# Patient Record
Sex: MALE
[De-identification: names, ages, dates, MRNs, and addresses within clinical notes are randomized per-mention and may not be internally consistent; named-entity substitution may affect disease eponyms.]

---

## 2017-04-14 ENCOUNTER — CHARTING TRANS (OUTPATIENT)
Dept: OTHER | Age: 22
End: 2017-04-14

## 2017-10-01 ENCOUNTER — HOSPITAL ENCOUNTER (EMERGENCY)
Facility: HOSPITAL | Age: 22
Discharge: HOME | End: 2017-10-01
Attending: EMERGENCY MEDICINE
Payer: COMMERCIAL

## 2017-10-01 VITALS
HEART RATE: 74 BPM | TEMPERATURE: 98 F | RESPIRATION RATE: 16 BRPM | OXYGEN SATURATION: 98 % | WEIGHT: 175 LBS | SYSTOLIC BLOOD PRESSURE: 144 MMHG | DIASTOLIC BLOOD PRESSURE: 83 MMHG

## 2017-10-01 DIAGNOSIS — F32.A DEPRESSION, UNSPECIFIED DEPRESSION TYPE: Primary | ICD-10-CM

## 2017-10-01 DIAGNOSIS — G47.00 INSOMNIA, UNSPECIFIED TYPE: ICD-10-CM

## 2017-10-01 PROCEDURE — 99284 EMERGENCY DEPT VISIT MOD MDM: CPT

## 2017-10-01 RX ORDER — CLONAZEPAM 0.5 MG/1
0.5 TABLET ORAL NIGHTLY PRN
Qty: 10 TABLET | Refills: 0 | Status: SHIPPED | OUTPATIENT
Start: 2017-10-01 | End: 2017-10-04

## 2017-10-02 NOTE — ED NOTES
DC instructions reviewed w/  Patient and his mother. He understands to FU w/ PCP. Resources given to patient by psych liaison patient denies any questions regarding info given. Vitals stable. Prescription provided w/ education.  All questions answered; pt d

## 2017-10-02 NOTE — ED INITIAL ASSESSMENT (HPI)
Pt has been off ADHD meds, per mom having\"mood swings\" recent break up with girlfriend. Denies any suicidial ideations.

## 2017-10-02 NOTE — ED PROVIDER NOTES
Patient Seen in: [facility] AND [facility] Emergency Department    History   Patient presents with:  Eval-P (psychiatric)    Stated Complaint: Psych eval    HPI    36-year-old male presents with mother for psychiatric evaluation.   Patient's girlfriend broke up w 75   Temp 98.4 °F (36.9 °C) (Oral)   Resp 16   Wt 79.4 kg   SpO2 98%         Physical Exam   Constitutional: He is oriented to person, place, and time. He appears well-developed and well-nourished. HENT:   Head: Normocephalic and atraumatic.    Neck: Norm of 10/1/2017  9:46 PM    START taking these medications    ClonazePAM 0.5 MG Oral Tab  Take 1 tablet (0.5 mg total) by mouth nightly as needed (sleep). , Normal, Disp-10 tablet, R-0

## 2017-10-23 ENCOUNTER — APPOINTMENT (OUTPATIENT)
Dept: CT IMAGING | Facility: HOSPITAL | Age: 22
End: 2017-10-23
Attending: EMERGENCY MEDICINE
Payer: COMMERCIAL

## 2017-10-23 ENCOUNTER — HOSPITAL ENCOUNTER (EMERGENCY)
Facility: HOSPITAL | Age: 22
Discharge: HOME | End: 2017-10-23
Attending: EMERGENCY MEDICINE
Payer: COMMERCIAL

## 2017-10-23 ENCOUNTER — HOSPITAL ENCOUNTER (OUTPATIENT)
Age: 22
Discharge: HOME | End: 2017-10-23
Attending: EMERGENCY MEDICINE
Payer: COMMERCIAL

## 2017-10-23 ENCOUNTER — APPOINTMENT (OUTPATIENT)
Dept: ULTRASOUND IMAGING | Facility: HOSPITAL | Age: 22
End: 2017-10-23
Payer: COMMERCIAL

## 2017-10-23 VITALS
TEMPERATURE: 98 F | RESPIRATION RATE: 16 BRPM | SYSTOLIC BLOOD PRESSURE: 127 MMHG | WEIGHT: 187 LBS | HEART RATE: 82 BPM | BODY MASS INDEX: 25 KG/M2 | OXYGEN SATURATION: 99 % | DIASTOLIC BLOOD PRESSURE: 76 MMHG

## 2017-10-23 VITALS
DIASTOLIC BLOOD PRESSURE: 90 MMHG | TEMPERATURE: 99 F | SYSTOLIC BLOOD PRESSURE: 155 MMHG | HEIGHT: 73 IN | RESPIRATION RATE: 18 BRPM | BODY MASS INDEX: 24.78 KG/M2 | HEART RATE: 80 BPM | OXYGEN SATURATION: 97 % | WEIGHT: 187 LBS

## 2017-10-23 DIAGNOSIS — N50.812 TESTICULAR PAIN, LEFT: Primary | ICD-10-CM

## 2017-10-23 DIAGNOSIS — N12 PYELONEPHRITIS: Primary | ICD-10-CM

## 2017-10-23 PROCEDURE — 87086 URINE CULTURE/COLONY COUNT: CPT | Performed by: EMERGENCY MEDICINE

## 2017-10-23 PROCEDURE — 93975 VASCULAR STUDY: CPT | Performed by: EMERGENCY MEDICINE

## 2017-10-23 PROCEDURE — 87077 CULTURE AEROBIC IDENTIFY: CPT | Performed by: EMERGENCY MEDICINE

## 2017-10-23 PROCEDURE — 99212 OFFICE O/P EST SF 10 MIN: CPT

## 2017-10-23 PROCEDURE — 81001 URINALYSIS AUTO W/SCOPE: CPT | Performed by: EMERGENCY MEDICINE

## 2017-10-23 PROCEDURE — 76870 US EXAM SCROTUM: CPT | Performed by: EMERGENCY MEDICINE

## 2017-10-23 PROCEDURE — 99284 EMERGENCY DEPT VISIT MOD MDM: CPT

## 2017-10-23 PROCEDURE — 36415 COLL VENOUS BLD VENIPUNCTURE: CPT

## 2017-10-23 PROCEDURE — 85025 COMPLETE CBC W/AUTO DIFF WBC: CPT | Performed by: EMERGENCY MEDICINE

## 2017-10-23 PROCEDURE — 74176 CT ABD & PELVIS W/O CONTRAST: CPT | Performed by: EMERGENCY MEDICINE

## 2017-10-23 PROCEDURE — 80048 BASIC METABOLIC PNL TOTAL CA: CPT | Performed by: EMERGENCY MEDICINE

## 2017-10-23 RX ORDER — CIPROFLOXACIN 500 MG/1
500 TABLET, FILM COATED ORAL 2 TIMES DAILY
Qty: 14 TABLET | Refills: 0 | Status: SHIPPED | OUTPATIENT
Start: 2017-10-23 | End: 2017-10-30
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
## 2017-10-23 NOTE — ED INITIAL ASSESSMENT (HPI)
Left Testicular pain since this am (0600) took advil and pain improved. Pain worse with with lifting objects.   Temp 99.6 2 days ago r/t cold and cough

## 2017-10-24 NOTE — ED PROVIDER NOTES
Patient Seen in: [facility] Emergency Department    History   Patient presents with:  Testicular Swelling    Stated Complaint: sent from [address] [place] [address], needs US on testicle    HPI    72-year-old male with history of ADHD here with complaints of left testicul Eyes: Negative for pain, discharge and redness. Respiratory: Negative for cough, shortness of breath and wheezing. Cardiovascular: Negative for chest pain. Gastrointestinal: Positive for abdominal pain and nausea.  Negative for diarrhea and vomitin no hernia noted   Musculoskeletal: Normal range of motion. He exhibits no tenderness. No CVA tenderness bilaterally   Neurological: He is alert and oriented to person, place, and time.    5/5 strength in b/l UEs and LEs, normal sensation in all extremitie 1.035   pH Urine 7.0 5.0 - 8.0   Protein Urine Negative Negative mg/dL   Glucose Urine Negative Negative mg/dL   Ketones Urine Negative Negative mg/dL   Bilirubin Urine Negative Negative   Blood Urine Moderate (A) Negative   Nitrite Urine Negative Negative pressure re-checked within 1 week was provided. Medical Record Review: I personally reviewed available prior medical records for any recent pertinent discharge summaries, testing, and procedures, and reviewed those reports. Complicating Factors:  The

## 2018-01-26 ENCOUNTER — OFFICE VISIT (OUTPATIENT)
Dept: FAMILY MEDICINE CLINIC | Facility: CLINIC | Age: 23
End: 2018-01-26

## 2018-01-26 ENCOUNTER — HOSPITAL ENCOUNTER (OUTPATIENT)
Dept: GENERAL RADIOLOGY | Age: 23
Discharge: HOME | End: 2018-01-26
Attending: FAMILY MEDICINE
Payer: COMMERCIAL

## 2018-01-26 VITALS
DIASTOLIC BLOOD PRESSURE: 87 MMHG | HEART RATE: 96 BPM | TEMPERATURE: 98 F | SYSTOLIC BLOOD PRESSURE: 143 MMHG | WEIGHT: 198.69 LBS | BODY MASS INDEX: 26 KG/M2

## 2018-01-26 DIAGNOSIS — M79.671 FOOT PAIN, RIGHT: Primary | ICD-10-CM

## 2018-01-26 DIAGNOSIS — M79.671 FOOT PAIN, RIGHT: ICD-10-CM

## 2018-01-26 PROCEDURE — 73630 X-RAY EXAM OF FOOT: CPT | Performed by: FAMILY MEDICINE

## 2018-01-26 PROCEDURE — 99202 OFFICE O/P NEW SF 15 MIN: CPT | Performed by: FAMILY MEDICINE

## 2018-01-26 PROCEDURE — 99212 OFFICE O/P EST SF 10 MIN: CPT | Performed by: FAMILY MEDICINE

## 2018-01-28 ENCOUNTER — TELEPHONE (OUTPATIENT)
Dept: FAMILY MEDICINE CLINIC | Facility: CLINIC | Age: 23
End: 2018-01-28

## 2018-01-28 DIAGNOSIS — M79.673 PAIN OF FOOT, UNSPECIFIED LATERALITY: Primary | ICD-10-CM

## 2018-01-29 NOTE — TELEPHONE ENCOUNTER
Sergio notified foot xr normal. Sergio is still having pain and was said it was like he didn't even go to the Dr as he wasn't given anything. Advised [facility] had referred him to podiatry, given number to schedule.

## 2018-02-20 ENCOUNTER — HOSPITAL ENCOUNTER (OUTPATIENT)
Age: 23
Discharge: HOME | End: 2018-02-20
Attending: FAMILY MEDICINE
Payer: COMMERCIAL

## 2018-02-20 VITALS
HEIGHT: 73 IN | OXYGEN SATURATION: 100 % | HEART RATE: 91 BPM | BODY MASS INDEX: 26.24 KG/M2 | WEIGHT: 198 LBS | TEMPERATURE: 98 F | SYSTOLIC BLOOD PRESSURE: 128 MMHG | DIASTOLIC BLOOD PRESSURE: 79 MMHG | RESPIRATION RATE: 16 BRPM

## 2018-02-20 DIAGNOSIS — A64 STI (SEXUALLY TRANSMITTED INFECTION): Primary | ICD-10-CM

## 2018-02-20 LAB
URINE BILIRUBIN: NEGATIVE
URINE CLARITY: CLEAR
URINE COLOR: YELLOW
URINE GLUCOSE: NEGATIVE MG/DL
URINE KETONES: NEGATIVE MG/DL
URINE NITRITE: NEGATIVE
URINE PH: 7
URINE PROTEIN: NEGATIVE MG /DL
URINE SPECIFIC GRAVITY: 1.02
URINE UROBILINOGEN: 0.2 MG/DL

## 2018-02-20 PROCEDURE — 96372 THER/PROPH/DIAG INJ SC/IM: CPT

## 2018-02-20 PROCEDURE — 99214 OFFICE O/P EST MOD 30 MIN: CPT

## 2018-02-20 PROCEDURE — 87591 N.GONORRHOEAE DNA AMP PROB: CPT | Performed by: FAMILY MEDICINE

## 2018-02-20 PROCEDURE — 87086 URINE CULTURE/COLONY COUNT: CPT | Performed by: FAMILY MEDICINE

## 2018-02-20 PROCEDURE — 87491 CHLMYD TRACH DNA AMP PROBE: CPT | Performed by: FAMILY MEDICINE

## 2018-02-20 PROCEDURE — 81002 URINALYSIS NONAUTO W/O SCOPE: CPT

## 2018-02-20 RX ORDER — CEFTRIAXONE 500 MG/1
250 INJECTION, POWDER, FOR SOLUTION INTRAMUSCULAR; INTRAVENOUS ONCE
Status: COMPLETED | OUTPATIENT
Start: 2018-02-20 | End: 2018-02-20

## 2018-02-20 RX ORDER — AZITHROMYCIN 250 MG/1
1000 TABLET, FILM COATED ORAL ONCE
Status: COMPLETED | OUTPATIENT
Start: 2018-02-20 | End: 2018-02-20

## 2018-02-20 NOTE — ED PROVIDER NOTES
Patient Seen in: [facility] Immediate [facility] In [address] [place] [address]    History   Patient presents with:  Eval-G (gynecologic)    Stated Complaint: Painful urination/discharge    HPI  Patient here with purulent penile discharge. For the past 2 days.   Per aysha penile discharge noted at the meatus.   Scant        ED Course     Labs Reviewed   [facility] POCT URINALYSIS DIPSTICK MANUAL   CHLAMYDIA/GONOCOCCUS, SERJIO   URINE CULTURE, ROUTINE       ED Course as of Feb 20 1543  --------------------------------------------------

## 2018-02-21 LAB
C TRACH DNA SPEC QL NAA+PROBE: NEGATIVE
N GONORRHOEA DNA SPEC QL NAA+PROBE: POSITIVE

## 2018-02-22 ENCOUNTER — APPOINTMENT (OUTPATIENT)
Dept: LAB | Age: 23
End: 2018-02-22
Attending: INTERNAL MEDICINE
Payer: COMMERCIAL

## 2018-02-22 ENCOUNTER — OFFICE VISIT (OUTPATIENT)
Dept: INTERNAL MEDICINE CLINIC | Facility: CLINIC | Age: 23
End: 2018-02-22

## 2018-02-22 VITALS
WEIGHT: 189 LBS | DIASTOLIC BLOOD PRESSURE: 82 MMHG | SYSTOLIC BLOOD PRESSURE: 143 MMHG | BODY MASS INDEX: 25.05 KG/M2 | HEART RATE: 102 BPM | HEIGHT: 73 IN

## 2018-02-22 DIAGNOSIS — IMO0002 ACUTE UROGENITAL GONORRHEA: Primary | ICD-10-CM

## 2018-02-22 DIAGNOSIS — IMO0002 ACUTE UROGENITAL GONORRHEA: ICD-10-CM

## 2018-02-22 PROCEDURE — 86803 HEPATITIS C AB TEST: CPT

## 2018-02-22 PROCEDURE — 87389 HIV-1 AG W/HIV-1&-2 AB AG IA: CPT

## 2018-02-22 PROCEDURE — 99214 OFFICE O/P EST MOD 30 MIN: CPT | Performed by: INTERNAL MEDICINE

## 2018-02-22 PROCEDURE — 36415 COLL VENOUS BLD VENIPUNCTURE: CPT

## 2018-02-22 PROCEDURE — 86780 TREPONEMA PALLIDUM: CPT

## 2018-02-22 PROCEDURE — 86706 HEP B SURFACE ANTIBODY: CPT

## 2018-02-22 PROCEDURE — 99212 OFFICE O/P EST SF 10 MIN: CPT | Performed by: INTERNAL MEDICINE

## 2018-02-22 NOTE — PROGRESS NOTES
HPI:    Patient ID: Yesi Childs is a 25year old male. HPI    STI  Pt arrives for a follow up after recent visit to urgent care on 02/20/2018. He was diagnosed with \"STI. \" Per HPI from the visit, \"Patient here with purulent penile discharge. 2.00         Types: Cigarettes  Smokeless tobacco: Former User                     Comment: finding it difficult to quit at the moment  Alcohol use:  Yes              Comment: last alcohol use was 9/30/17--1-2 beer               during weeknight, 12-15 beer HIV AG AB COMBO, HEPATITIS B SURFACE ANTIBODY, HCV ANTIBODY, T PALLIDUM SCREENING [facility]      Orders Placed This Encounter      HIV AG AB Combo [E]      Hepatitis B Surface Antibody [E]      HCV Antibody [E]      T Pallidum Screening [place] TREP [E]    M

## 2018-02-23 LAB
HBV SURFACE AB SER-ACNC: 400.36 MIU/ML (ref ?–10)
HBV SURFACE AG SERPL QL IA: REACTIVE
HCV AB SERPL QL IA: NONREACTIVE
HIV1+2 AB SERPL QL IA: NONREACTIVE
T PALLIDUM AB SER QL: NEGATIVE

## 2018-02-28 ENCOUNTER — TELEPHONE (OUTPATIENT)
Dept: OTHER | Age: 23
End: 2018-02-28

## 2018-02-28 DIAGNOSIS — R76.8 HEPATITIS B ANTIBODY POSITIVE: Primary | ICD-10-CM

## 2018-02-28 NOTE — TELEPHONE ENCOUNTER
Advised patient on Dr. Scar March information and recommendation, lab to follow-up hepatitis B. Patient verbalized understanding and will get the lab done. Lab order created per Dr. Ritika Santoro.         Notes Recorded by Ting Alexander on 2/28/2018 at 3:42 PM CST  F

## 2018-04-10 ENCOUNTER — OFFICE VISIT (OUTPATIENT)
Dept: FAMILY MEDICINE CLINIC | Facility: CLINIC | Age: 23
End: 2018-04-10

## 2018-04-10 VITALS
HEART RATE: 72 BPM | OXYGEN SATURATION: 98 % | SYSTOLIC BLOOD PRESSURE: 118 MMHG | DIASTOLIC BLOOD PRESSURE: 70 MMHG | RESPIRATION RATE: 14 BRPM | TEMPERATURE: 98 F

## 2018-04-10 DIAGNOSIS — J02.9 ACUTE PHARYNGITIS, UNSPECIFIED ETIOLOGY: Primary | ICD-10-CM

## 2018-04-10 PROCEDURE — 87081 CULTURE SCREEN ONLY: CPT | Performed by: NURSE PRACTITIONER

## 2018-04-10 PROCEDURE — 99202 OFFICE O/P NEW SF 15 MIN: CPT | Performed by: NURSE PRACTITIONER

## 2018-04-10 PROCEDURE — 87880 STREP A ASSAY W/OPTIC: CPT | Performed by: NURSE PRACTITIONER

## 2018-04-10 RX ORDER — DEXTROAMPHETAMINE SACCHARATE, AMPHETAMINE ASPARTATE MONOHYDRATE, DEXTROAMPHETAMINE SULFATE AND AMPHETAMINE SULFATE 6.25; 6.25; 6.25; 6.25 MG/1; MG/1; MG/1; MG/1
1 CAPSULE, EXTENDED RELEASE ORAL
COMMUNITY
Start: 2017-01-30 | End: 2021-05-17

## 2018-04-10 NOTE — PATIENT INSTRUCTIONS
Pharyngitis (Sore Throat), Report Pending    Pharyngitis (sore throat) is often due to a virus. It can also be caused by the streptococcus, or strep, bacterium, often called strep throat.  Both viral and strep infections can cause throat pain that is wors · For children: Use acetaminophen for fever, fussiness, or discomfort.  In infants older than 10months of age, you may use ibuprofen instead of acetaminophen. Talk with your child's healthcare provider before giving these medicines if your child has chronic · Signs of dehydration (very dark urine or no urine, sunken eyes, dizziness)  · Trouble breathing or noisy breathing  · Muffled voice  · New rash  · Child appears to be getting sicker  Date Last Reviewed: 4/13/2015  © 2692-4126 [employer].  8

## 2018-04-10 NOTE — PROGRESS NOTES
CHIEF COMPLAINT:   Patient presents with:  Sore Throat        HPI:   Monae Turner is a 25year old male presents to clinic with complaint of sore throat starting this morning. Other household members sick.  Patient reports due to sore throat he mi LUNGS: clear to auscultation bilaterally, no wheezes or rhonchi. Breathing is non labored. CARDIO: RRR without murmur  EXTREMITIES: no cyanosis, clubbing or edema  LYMPH: no anterior cervical. no submandibular lymphadenopathy.   No posterior cervical or oc A test has been done to find out whether you (or your child, if your child is the patient) have strep throat. Call this facility or your healthcare provider if you were not given your test results.  If the test is positive for strep infection, you will need · Use throat lozenges or numbing throat sprays to help reduce pain. Gargling with warm salt water will also help reduce throat pain. For this, dissolve 1/2 teaspoon of salt in 1 glass of warm water.  To help soothe a sore throat, children can sip on juice o

## 2018-06-05 ENCOUNTER — APPOINTMENT (OUTPATIENT)
Dept: OCCUPATIONAL MEDICINE | Age: 23
End: 2018-06-05
Attending: EMERGENCY MEDICINE

## 2019-04-24 ENCOUNTER — TELEPHONE (OUTPATIENT)
Dept: SCHEDULING | Age: 24
End: 2019-04-24

## 2019-08-16 ENCOUNTER — HOSPITAL ENCOUNTER (OUTPATIENT)
Age: 24
Discharge: HOME | End: 2019-08-16
Attending: EMERGENCY MEDICINE
Payer: COMMERCIAL

## 2019-08-16 VITALS
DIASTOLIC BLOOD PRESSURE: 80 MMHG | OXYGEN SATURATION: 97 % | HEIGHT: 73 IN | HEART RATE: 91 BPM | SYSTOLIC BLOOD PRESSURE: 124 MMHG | RESPIRATION RATE: 18 BRPM | WEIGHT: 215 LBS | BODY MASS INDEX: 28.49 KG/M2 | TEMPERATURE: 98 F

## 2019-08-16 DIAGNOSIS — R30.0 DYSURIA: Primary | ICD-10-CM

## 2019-08-16 LAB
BILIRUB UR QL STRIP: NEGATIVE
CLARITY UR: CLEAR
COLOR UR: YELLOW
GLUCOSE UR STRIP-MCNC: NEGATIVE MG/DL
HGB UR QL STRIP: NEGATIVE
KETONES UR STRIP-MCNC: NEGATIVE MG/DL
NITRITE UR QL STRIP: NEGATIVE
PH UR STRIP: 7 [PH]
PROT UR STRIP-MCNC: NEGATIVE MG/DL
SP GR UR STRIP: 1.02
UROBILINOGEN UR STRIP-ACNC: <2 MG/DL

## 2019-08-16 PROCEDURE — 99214 OFFICE O/P EST MOD 30 MIN: CPT

## 2019-08-16 PROCEDURE — 81002 URINALYSIS NONAUTO W/O SCOPE: CPT

## 2019-08-16 PROCEDURE — 87086 URINE CULTURE/COLONY COUNT: CPT | Performed by: EMERGENCY MEDICINE

## 2019-08-16 PROCEDURE — 87591 N.GONORRHOEAE DNA AMP PROB: CPT | Performed by: EMERGENCY MEDICINE

## 2019-08-16 PROCEDURE — 87491 CHLMYD TRACH DNA AMP PROBE: CPT | Performed by: EMERGENCY MEDICINE

## 2019-08-16 RX ORDER — CEPHALEXIN 500 MG/1
500 CAPSULE ORAL 2 TIMES DAILY
Qty: 14 CAPSULE | Refills: 0 | Status: SHIPPED | OUTPATIENT
Start: 2019-08-16 | End: 2019-08-23

## 2019-08-16 NOTE — ED NOTES
Advised to pract safe sex. Increase po fluids wait for cultures to come back, iif + for uti follow upwith pcp or urology.

## 2019-08-16 NOTE — ED PROVIDER NOTES
Patient Seen in: [facility] In [place]    History     Stated Complaint: urinary symptoms    HPI    This patient complains of burning with urination which he has had for the past week.   Patient denies fever chills abdominal pain or (6' 1\")   Wt 97.5 kg   SpO2 97%   BMI 28.37 kg/m²         Physical Exam    Patient is awake and alert  Eyes pupils are equal reactive  ENT there are moist mucous membranes  Back no CVA area tenderness  Lungs are clear to auscultation  Cardiac there are no

## 2019-08-18 LAB
C TRACH DNA SPEC QL NAA+PROBE: NEGATIVE
N GONORRHOEA DNA SPEC QL NAA+PROBE: NEGATIVE

## 2020-04-24 ENCOUNTER — APPOINTMENT (OUTPATIENT)
Dept: OTHER | Facility: HOSPITAL | Age: 25
End: 2020-04-24
Attending: EMERGENCY MEDICINE

## 2020-04-24 ENCOUNTER — NURSE ONLY (OUTPATIENT)
Dept: INTERNAL MEDICINE CLINIC | Facility: HOSPITAL | Age: 25
End: 2020-04-24
Attending: EMERGENCY MEDICINE

## 2020-04-24 DIAGNOSIS — Z00.00 WELLNESS EXAMINATION: Primary | ICD-10-CM

## 2020-04-24 PROCEDURE — 86787 VARICELLA-ZOSTER ANTIBODY: CPT

## 2020-04-24 PROCEDURE — 86480 TB TEST CELL IMMUN MEASURE: CPT

## 2020-05-27 ENCOUNTER — APPOINTMENT (OUTPATIENT)
Dept: GENERAL RADIOLOGY | Facility: HOSPITAL | Age: 25
End: 2020-05-27
Attending: PHYSICIAN ASSISTANT
Payer: COMMERCIAL

## 2020-05-27 ENCOUNTER — HOSPITAL ENCOUNTER (EMERGENCY)
Facility: HOSPITAL | Age: 25
Discharge: HOME | End: 2020-05-27
Attending: PHYSICIAN ASSISTANT
Payer: COMMERCIAL

## 2020-05-27 VITALS
BODY MASS INDEX: 28.49 KG/M2 | OXYGEN SATURATION: 98 % | SYSTOLIC BLOOD PRESSURE: 128 MMHG | RESPIRATION RATE: 18 BRPM | TEMPERATURE: 98 F | DIASTOLIC BLOOD PRESSURE: 75 MMHG | WEIGHT: 215 LBS | HEART RATE: 98 BPM | HEIGHT: 73 IN

## 2020-05-27 DIAGNOSIS — S50.02XA CONTUSION OF LEFT ELBOW, INITIAL ENCOUNTER: ICD-10-CM

## 2020-05-27 DIAGNOSIS — S93.602A FOOT SPRAIN, LEFT, INITIAL ENCOUNTER: ICD-10-CM

## 2020-05-27 DIAGNOSIS — T07.XXXA ABRASION, MULTIPLE SITES: ICD-10-CM
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
## 2020-05-27 DIAGNOSIS — S93.402A SPRAIN OF LEFT ANKLE, UNSPECIFIED LIGAMENT, INITIAL ENCOUNTER: Primary | ICD-10-CM

## 2020-05-27 DIAGNOSIS — S40.012A CONTUSION OF LEFT SHOULDER, INITIAL ENCOUNTER: ICD-10-CM

## 2020-05-27 PROCEDURE — 73630 X-RAY EXAM OF FOOT: CPT | Performed by: PHYSICIAN ASSISTANT

## 2020-05-27 PROCEDURE — 99284 EMERGENCY DEPT VISIT MOD MDM: CPT

## 2020-05-27 PROCEDURE — 73610 X-RAY EXAM OF ANKLE: CPT | Performed by: PHYSICIAN ASSISTANT

## 2020-05-27 RX ORDER — CEPHALEXIN 500 MG/1
500 CAPSULE ORAL 3 TIMES DAILY
Qty: 21 CAPSULE | Refills: 0 | Status: SHIPPED | OUTPATIENT
Start: 2020-05-27 | End: 2020-06-03

## 2020-05-27 RX ORDER — IBUPROFEN 600 MG/1
TABLET ORAL
Qty: 20 TABLET | Refills: 0 | Status: SHIPPED | OUTPATIENT
Start: 2020-05-27
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
## 2020-05-27 NOTE — ED NOTES
Pt able to dress independently and ambulates steadily with crutches. Discharge instructions reviewed and pt verbalized understanding.
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
## 2020-08-15 ENCOUNTER — HOSPITAL ENCOUNTER (EMERGENCY)
Facility: HOSPITAL | Age: 25
Discharge: HOME | End: 2020-08-15
Attending: PHYSICIAN ASSISTANT
Payer: OTHER MISCELLANEOUS

## 2020-08-15 VITALS
TEMPERATURE: 98 F | WEIGHT: 220 LBS | RESPIRATION RATE: 18 BRPM | DIASTOLIC BLOOD PRESSURE: 95 MMHG | OXYGEN SATURATION: 97 % | HEART RATE: 98 BPM | SYSTOLIC BLOOD PRESSURE: 160 MMHG | BODY MASS INDEX: 29 KG/M2

## 2020-08-15 DIAGNOSIS — S09.90XA CLOSED HEAD INJURY WITHOUT LOSS OF CONSCIOUSNESS, INITIAL ENCOUNTER: ICD-10-CM

## 2020-08-15 DIAGNOSIS — S00.432A CONTUSION OF AURICLE OF LEFT EAR, INITIAL ENCOUNTER: Primary | ICD-10-CM

## 2020-08-15 DIAGNOSIS — R03.0 ELEVATED BLOOD PRESSURE READING: ICD-10-CM

## 2020-08-15 PROCEDURE — 99283 EMERGENCY DEPT VISIT LOW MDM: CPT
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
## 2020-08-15 NOTE — ED NOTES
Spoke with Bernard Castillo, who states there's no protocol that needs to be followed for [employer] employee. Advised pt to complete RL6.

## 2020-08-24 ENCOUNTER — TELEPHONE (OUTPATIENT)
Dept: INTERNAL MEDICINE CLINIC | Facility: HOSPITAL | Age: 25
End: 2020-08-24

## 2020-08-24 DIAGNOSIS — Z20.822 EXPOSURE TO COVID-19 VIRUS: Primary | ICD-10-CM

## 2020-08-27 ENCOUNTER — APPOINTMENT (OUTPATIENT)
Dept: LAB | Age: 25
End: 2020-08-27
Attending: PREVENTIVE MEDICINE
Payer: COMMERCIAL

## 2020-08-27 ENCOUNTER — TELEPHONE (OUTPATIENT)
Dept: INTERNAL MEDICINE CLINIC | Facility: HOSPITAL | Age: 25
End: 2020-08-27

## 2020-08-27 DIAGNOSIS — Z20.822 EXPOSURE TO COVID-19 VIRUS: ICD-10-CM

## 2020-08-27 DIAGNOSIS — Z20.822 EXPOSURE TO COVID-19 VIRUS: Primary | ICD-10-CM

## 2020-08-27 LAB — SARS-COV-2 RNA RESP QL NAA+PROBE: NOT DETECTED

## 2020-09-04 DIAGNOSIS — Z20.822 CLOSE EXPOSURE TO COVID-19 VIRUS: Primary | ICD-10-CM

## 2021-03-19 ENCOUNTER — LABORATORY ENCOUNTER (OUTPATIENT)
Dept: LAB | Facility: REFERENCE LAB | Age: 26
End: 2021-03-19
Attending: FAMILY MEDICINE
Payer: COMMERCIAL

## 2021-03-19 ENCOUNTER — EKG ENCOUNTER (OUTPATIENT)
Dept: LAB | Age: 26
End: 2021-03-19
Attending: FAMILY MEDICINE
Payer: COMMERCIAL

## 2021-03-19 DIAGNOSIS — R94.31 ABNORMAL EKG: Primary | ICD-10-CM

## 2021-03-19 PROCEDURE — 80053 COMPREHEN METABOLIC PANEL: CPT | Performed by: FAMILY MEDICINE

## 2021-03-19 PROCEDURE — 93005 ELECTROCARDIOGRAM TRACING: CPT

## 2021-03-19 PROCEDURE — 36415 COLL VENOUS BLD VENIPUNCTURE: CPT | Performed by: FAMILY MEDICINE

## 2021-03-19 PROCEDURE — 83036 HEMOGLOBIN GLYCOSYLATED A1C: CPT | Performed by: FAMILY MEDICINE

## 2021-03-19 PROCEDURE — 80061 LIPID PANEL: CPT | Performed by: FAMILY MEDICINE

## 2021-03-19 PROCEDURE — 93010 ELECTROCARDIOGRAM REPORT: CPT | Performed by: FAMILY MEDICINE

## 2021-03-19 NOTE — PROGRESS NOTES
HPI:    Patient ID: Gisela Howell is a 26year old male who presents as new patient for adhd, reflux, and smoking cessation. HPI   GERD:  Really bad for past year. Takes TUMS like candy. They help.    Triggers: pastas, etoh, craft beer, whiske level: Not on file    Occupational History      Not on file    Tobacco Use      Smoking status: Current Every Day Smoker        Packs/day: 1.00        Years: 3.00        Pack years: 3        Types: Cigarettes      Smokeless tobacco: Former User      Tobacc Psychiatric/Behavioral: Positive for behavioral problems and decreased concentration. All other systems reviewed and are negative.            /80   Pulse 80   Ht 6' 1\" (1.854 m)   Wt 214 lb (97.1 kg)   SpO2 98%   BMI 28.23 kg/m²     PHYSICAL EXAM No rash. Neurological:      General: No focal deficit present. Mental Status: He is alert. Psychiatric:         Mood and Affect: Mood normal.         Behavior: Behavior normal.         Thought Content:  Thought content normal.         Judgment: [place] his tobacco use risks and options using the USPSTF's Five A's approach:    Ask: Ngozi Leroy is using tobacco products. Assess: We asked about/assessed behavioral health risk and factors affecting choice of behavior change goals/methods.   Specifically I asked

## 2021-03-21 PROBLEM — R94.31 ABNORMAL EKG: Status: ACTIVE | Noted: 2021-03-21

## 2021-03-21 PROBLEM — K21.9 GASTROESOPHAGEAL REFLUX DISEASE: Status: ACTIVE | Noted: 2021-03-21

## 2021-03-21 PROBLEM — F17.210 CIGARETTE SMOKER: Status: ACTIVE | Noted: 2021-03-21

## 2021-04-01 DIAGNOSIS — Z23 NEED FOR VACCINATION: ICD-10-CM

## 2021-05-17 PROBLEM — I45.10 RIGHT BUNDLE BRANCH BLOCK: Status: ACTIVE | Noted: 2021-05-17

## 2021-05-17 NOTE — PROGRESS NOTES
HPI:    Patient ID: Angelina Fernandez is a 26year old male who presents for ADHD and smoking cessation f/u. HPI   ADHD:  Feels better on medication. More focused at work, but feels he needs something stronger.     Tobacco use:   Still smoking edema.   Skin:     General: Skin is warm and dry. Capillary Refill: Capillary refill takes less than 2 seconds. Neurological:      General: No focal deficit present. Mental Status: He is alert.    Psychiatric:         Mood and Affect: Mood alessandra

## 2021-08-28 ENCOUNTER — HOSPITAL ENCOUNTER (OUTPATIENT)
Age: 26
Discharge: HOME | End: 2021-08-28
Payer: COMMERCIAL

## 2021-08-28 VITALS
HEART RATE: 98 BPM | RESPIRATION RATE: 16 BRPM | SYSTOLIC BLOOD PRESSURE: 159 MMHG | DIASTOLIC BLOOD PRESSURE: 66 MMHG | TEMPERATURE: 98 F | OXYGEN SATURATION: 99 %

## 2021-08-28 DIAGNOSIS — B34.9 VIRAL ILLNESS: ICD-10-CM

## 2021-08-28 DIAGNOSIS — Z20.822 ENCOUNTER FOR LABORATORY TESTING FOR COVID-19 VIRUS: Primary | ICD-10-CM

## 2021-08-28 LAB — SARS-COV-2 RNA RESP QL NAA+PROBE: NOT DETECTED

## 2021-08-28 PROCEDURE — U0002 COVID-19 LAB TEST NON-CDC: HCPCS | Performed by: NURSE PRACTITIONER

## 2021-08-28 PROCEDURE — 99213 OFFICE O/P EST LOW 20 MIN: CPT | Performed by: NURSE PRACTITIONER

## 2021-08-28 NOTE — ED INITIAL ASSESSMENT (HPI)
Sinus pressure, congestion, runny nose x 2 weeks. Started with fever and body aches two days go. 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 Not vaccinated.

## 2021-08-29 NOTE — ED PROVIDER NOTES
Patient Seen in: [facility] [place]      History   Patient presents with:   Body ache and/or chills    Stated Complaint: SINUS INFECTION FEVER BODY ACHES    HPI/Subjective:   HPI    18yo male otherise healthy arrive sto the ic with co facial pressure, Temp 97.8 °F (36.6 °C)   Resp 16   SpO2 99%         Physical Exam  Constitutional:       Appearance: He is well-developed. HENT:      Head: Normocephalic and atraumatic. Nose: Congestion and rhinorrhea present.       Mouth/Throat:      Pharynx: Oroph instructed. The patient verbalized understanding of the discharge instructions and plan, also including, if needed, prescription drug management and or OTC drug management.      I spent a total of 17 minutes during chart review, obtaining history, performin

## 2021-08-30 ENCOUNTER — HOSPITAL ENCOUNTER (OUTPATIENT)
Age: 26
Discharge: HOME | End: 2021-08-30
Payer: COMMERCIAL

## 2021-08-30 VITALS
HEART RATE: 95 BPM | RESPIRATION RATE: 16 BRPM | WEIGHT: 215 LBS | DIASTOLIC BLOOD PRESSURE: 75 MMHG | HEIGHT: 73 IN | OXYGEN SATURATION: 97 % | TEMPERATURE: 98 F | SYSTOLIC BLOOD PRESSURE: 132 MMHG | BODY MASS INDEX: 28.49 KG/M2

## 2021-08-30 DIAGNOSIS — R09.81 NASAL CONGESTION: ICD-10-CM

## 2021-08-30 DIAGNOSIS — Z20.822 ENCOUNTER FOR LABORATORY TESTING FOR COVID-19 VIRUS: Primary | ICD-10-CM

## 2021-08-30 PROCEDURE — 99212 OFFICE O/P EST SF 10 MIN: CPT | Performed by: NURSE PRACTITIONER

## 2021-08-31 ENCOUNTER — TELEPHONE (OUTPATIENT)
Dept: INTERNAL MEDICINE CLINIC | Facility: HOSPITAL | Age: 26
End: 2021-08-31

## 2021-08-31 NOTE — TELEPHONE ENCOUNTER
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 9/3/2021    Did you have close contact with someone on your unit while not wearing a mask? (e.g., during meal breaks):  Yes [x]   No []    If yes, who:  Entire department  Do you share a workspace?  Yes [x]   No []       If yes, COVID infection in past 3 months: Remove from work. Order Alinity testing at                                       least 5 days from exposure.                                        If negative may return to work after 7 days from exposure date (on day 8 a

## 2021-09-01 LAB — SARS-COV-2 RNA RESP QL NAA+PROBE: NOT DETECTED

## 2021-09-03 NOTE — ED PROVIDER NOTES
Patient Seen in: [facility] [place]      History   Patient presents with:  Covid-19 Test    Stated Complaint: chills/fatigue/sorethroat/mother+    HPI/Subjective:   HPI    Nasal congestion and covid exposure    Objective:   Past Medical History:   Lenin Witt Appearance: He is well-developed. HENT:      Head: Normocephalic and atraumatic. Nose: Congestion and rhinorrhea present. Eyes:      Extraocular Movements: Extraocular movements intact. Pupils: Pupils are equal, round, and reactive to light. not including time spent performing procedures. MDM   Findings consistent with viral illness less lsikely sepsis, pnx, meningitis, advised pt to quarantine.                              Disposition and Plan     Clinical Impression:  Encounter for l

## 2021-09-07 ENCOUNTER — HOSPITAL ENCOUNTER (EMERGENCY)
Facility: HOSPITAL | Age: 26
Discharge: HOME | End: 2021-09-07
Payer: COMMERCIAL

## 2021-09-07 VITALS
TEMPERATURE: 98 F | HEART RATE: 85 BPM | OXYGEN SATURATION: 100 % | RESPIRATION RATE: 18 BRPM | DIASTOLIC BLOOD PRESSURE: 78 MMHG | SYSTOLIC BLOOD PRESSURE: 128 MMHG

## 2021-09-07 DIAGNOSIS — J02.0 STREP PHARYNGITIS: Primary | ICD-10-CM

## 2021-09-07 LAB — S PYO AG THROAT QL: POSITIVE

## 2021-09-07 PROCEDURE — 87880 STREP A ASSAY W/OPTIC: CPT

## 2021-09-07 PROCEDURE — 99283 EMERGENCY DEPT VISIT LOW MDM: CPT

## 2021-09-07 RX ORDER — AMOXICILLIN 500 MG/1
500 TABLET, FILM COATED ORAL 2 TIMES DAILY
Qty: 20 TABLET | Refills: 0 | Status: SHIPPED | OUTPATIENT
Start: 2021-09-07 | End: 2021-09-17

## 2021-09-07 NOTE — ED INITIAL ASSESSMENT (HPI)
Patient aox3 ambulatory to ed via private vehicle patient co of sore throat x yesterday night +swollen tonsils noted.

## 2021-09-14 NOTE — ED PROVIDER NOTES
HPI    23year oldmale who presents with sore throat x 1 days. No fever  No cough  No shortness of breath  The patient does not have any sick contacts with similar. Pain is worse with swallowing. Pt is tolerating liquids.  Denies: neck stiffness, trau (36.9 °C)   Temp src Oral   SpO2 100 %   O2 Device None (Room air)       Current:/78   Pulse 85   Temp 98.4 °F (36.9 °C) (Oral)   Resp 18   SpO2 100%     GENERAL: well developed, well nourished, well hydrated, no distress  HEAD: normocephalic, atraum

## 2021-10-21 ENCOUNTER — HOSPITAL ENCOUNTER (EMERGENCY)
Facility: HOSPITAL | Age: 26
Discharge: HOME | End: 2021-10-21
Attending: EMERGENCY MEDICINE
Payer: COMMERCIAL

## 2021-10-21 VITALS
RESPIRATION RATE: 18 BRPM | BODY MASS INDEX: 28 KG/M2 | OXYGEN SATURATION: 100 % | TEMPERATURE: 98 F | HEART RATE: 86 BPM | SYSTOLIC BLOOD PRESSURE: 143 MMHG | DIASTOLIC BLOOD PRESSURE: 92 MMHG | WEIGHT: 215 LBS

## 2021-10-21 DIAGNOSIS — G43.109 OCULAR MIGRAINE: Primary | ICD-10-CM

## 2021-10-21 PROCEDURE — 99284 EMERGENCY DEPT VISIT MOD MDM: CPT

## 2021-10-21 PROCEDURE — 96374 THER/PROPH/DIAG INJ IV PUSH: CPT

## 2021-10-21 PROCEDURE — 96361 HYDRATE IV INFUSION ADD-ON: CPT

## 2021-10-21 PROCEDURE — 96375 TX/PRO/DX INJ NEW DRUG ADDON: CPT

## 2021-10-21 RX ORDER — METOCLOPRAMIDE HYDROCHLORIDE 5 MG/ML
10 INJECTION INTRAMUSCULAR; INTRAVENOUS ONCE
Status: COMPLETED | OUTPATIENT
Start: 2021-10-21 | End: 2021-10-21

## 2021-10-21 RX ORDER — BUTALBITAL, ACETAMINOPHEN AND CAFFEINE 50; 325; 40 MG/1; MG/1; MG/1
1 TABLET ORAL EVERY 6 HOURS PRN
Qty: 10 TABLET | Refills: 0 | Status: SHIPPED | OUTPATIENT
Start: 2021-10-21

## 2021-10-21 RX ORDER — DIPHENHYDRAMINE HYDROCHLORIDE 50 MG/ML
25 INJECTION INTRAMUSCULAR; INTRAVENOUS ONCE
Status: COMPLETED | OUTPATIENT
Start: 2021-10-21 | End: 2021-10-21

## 2021-10-21 RX ORDER — DEXAMETHASONE SODIUM PHOSPHATE 4 MG/ML
4 VIAL (ML) INJECTION ONCE
Status: COMPLETED | OUTPATIENT
Start: 2021-10-21 | End: 2021-10-21

## 2021-10-21 RX ORDER — KETOROLAC TROMETHAMINE 15 MG/ML
15 INJECTION, SOLUTION INTRAMUSCULAR; INTRAVENOUS ONCE
Status: COMPLETED | OUTPATIENT
Start: 2021-10-21 | End: 2021-10-21

## 2021-10-21 NOTE — ED INITIAL ASSESSMENT (HPI)
Patient complains of headache for two days, states his R eye was blurry yesterday, not blurry at this time

## 2021-10-22 NOTE — ED PROVIDER NOTES
Patient Seen in: [facility] AND [facility] Emergency Department      History   Patient presents with:  Headache    Stated Complaint: migraine    Subjective:   HPI    32-year-old male healthy here for evaluation of recurrent hemoptysis starting yesterday improved atraumatic. Eyes: Conjunctivae are normal. Pupils are equal, round, and reactive to light. Fundus difficult to visualize bilaterally but grossly appears normal.  Extraocular muscle movements are intact  Neck: Normal range of motion. Neck supple.   No men

## 2022-10-05 ENCOUNTER — HOSPITAL ENCOUNTER (OUTPATIENT)
Age: 27
Discharge: HOME | End: 2022-10-05
Payer: COMMERCIAL

## 2022-10-05 ENCOUNTER — APPOINTMENT (OUTPATIENT)
Dept: GENERAL RADIOLOGY | Age: 27
End: 2022-10-05
Attending: NURSE PRACTITIONER
Payer: COMMERCIAL

## 2022-10-05 VITALS
RESPIRATION RATE: 16 BRPM | HEIGHT: 73 IN | SYSTOLIC BLOOD PRESSURE: 124 MMHG | OXYGEN SATURATION: 97 % | WEIGHT: 250 LBS | TEMPERATURE: 97 F | HEART RATE: 107 BPM | DIASTOLIC BLOOD PRESSURE: 80 MMHG | BODY MASS INDEX: 33.13 KG/M2

## 2022-10-05 DIAGNOSIS — Z71.1 CONCERN ABOUT STD IN MALE WITHOUT DIAGNOSIS: ICD-10-CM

## 2022-10-05 DIAGNOSIS — R30.0 DYSURIA: Primary | ICD-10-CM

## 2022-10-05 DIAGNOSIS — S99.921A INJURY OF RIGHT FOOT, INITIAL ENCOUNTER: ICD-10-CM

## 2022-10-05 DIAGNOSIS — M79.671 RIGHT FOOT PAIN: ICD-10-CM

## 2022-10-05 LAB
BILIRUB UR QL STRIP: NEGATIVE
CLARITY UR: CLEAR
COLOR UR: YELLOW
GLUCOSE UR STRIP-MCNC: NEGATIVE MG/DL
HGB UR QL STRIP: NEGATIVE
KETONES UR STRIP-MCNC: NEGATIVE MG/DL
NITRITE UR QL STRIP: NEGATIVE
PH UR STRIP: 6 [PH]
PROT UR STRIP-MCNC: NEGATIVE MG/DL
SP GR UR STRIP: 1.02
UROBILINOGEN UR STRIP-ACNC: <2 MG/DL

## 2022-10-05 PROCEDURE — 99214 OFFICE O/P EST MOD 30 MIN: CPT | Performed by: NURSE PRACTITIONER

## 2022-10-05 PROCEDURE — 73630 X-RAY EXAM OF FOOT: CPT | Performed by: NURSE PRACTITIONER

## 2022-10-05 PROCEDURE — 81002 URINALYSIS NONAUTO W/O SCOPE: CPT | Performed by: NURSE PRACTITIONER

## 2022-10-05 PROCEDURE — 96372 THER/PROPH/DIAG INJ SC/IM: CPT | Performed by: NURSE PRACTITIONER

## 2022-10-05 RX ORDER — AZITHROMYCIN 250 MG/1
1000 TABLET, FILM COATED ORAL ONCE
Status: COMPLETED | OUTPATIENT
Start: 2022-10-05 | End: 2022-10-05
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
## 2022-10-06 LAB
C TRACH DNA SPEC QL NAA+PROBE: POSITIVE
N GONORRHOEA DNA SPEC QL NAA+PROBE: NEGATIVE

## 2022-10-11 ENCOUNTER — TELEPHONE (OUTPATIENT)
Dept: FAMILY MEDICINE CLINIC | Facility: CLINIC | Age: 27
End: 2022-10-11

## 2022-10-11 DIAGNOSIS — M79.671 RIGHT FOOT PAIN: Primary | ICD-10-CM

## 2022-10-11 NOTE — TELEPHONE ENCOUNTER
Spoke to patient, verified Name and . Referral information provided. Patient verbalized understanding and had no further questions at this time.

## 2022-10-11 NOTE — TELEPHONE ENCOUNTER
States went to [facility] 10/5/22 and xray was done with recommendation to see ortho . Patient has HMO and will need a referral from PCP. Still with right foot pain and wanted to get a referral to see ortho ASAP. Offered office appointment for tomorrow 10/12/22 but declines, requetsing to speak with Dr Eric Rivas number verified.            Future Appointments   Date Time Provider Smiley Lynch   10/26/2022 11:30 AM Priya Jacobson,  [facility] 10 FP [facility] 10 OP

## 2022-10-26 ENCOUNTER — OFFICE VISIT (OUTPATIENT)
Dept: FAMILY MEDICINE CLINIC | Facility: CLINIC | Age: 27
End: 2022-10-26
Payer: COMMERCIAL

## 2022-10-26 VITALS
OXYGEN SATURATION: 96 % | SYSTOLIC BLOOD PRESSURE: 122 MMHG | BODY MASS INDEX: 28.76 KG/M2 | WEIGHT: 217 LBS | HEIGHT: 73 IN | DIASTOLIC BLOOD PRESSURE: 78 MMHG | HEART RATE: 94 BPM

## 2022-10-26 DIAGNOSIS — Z23 NEED FOR VACCINATION: ICD-10-CM

## 2022-10-26 DIAGNOSIS — M79.671 RIGHT FOOT PAIN: ICD-10-CM

## 2022-10-26 DIAGNOSIS — Z00.00 PHYSICAL EXAM, ANNUAL: Primary | ICD-10-CM

## 2022-10-26 DIAGNOSIS — F90.2 ATTENTION DEFICIT HYPERACTIVITY DISORDER (ADHD), COMBINED TYPE: ICD-10-CM

## 2022-10-26 PROCEDURE — 3008F BODY MASS INDEX DOCD: CPT | Performed by: FAMILY MEDICINE

## 2022-10-26 PROCEDURE — 99395 PREV VISIT EST AGE 18-39: CPT | Performed by: FAMILY MEDICINE

## 2022-10-26 PROCEDURE — 90715 TDAP VACCINE 7 YRS/> IM: CPT | Performed by: FAMILY MEDICINE

## 2022-10-26 PROCEDURE — 90471 IMMUNIZATION ADMIN: CPT | Performed by: FAMILY MEDICINE

## 2022-10-26 PROCEDURE — 3074F SYST BP LT 130 MM HG: CPT | Performed by: FAMILY MEDICINE

## 2022-10-26 PROCEDURE — 90472 IMMUNIZATION ADMIN EACH ADD: CPT | Performed by: FAMILY MEDICINE

## 2022-10-26 PROCEDURE — 3078F DIAST BP <80 MM HG: CPT | Performed by: FAMILY MEDICINE

## 2022-10-26 PROCEDURE — 99214 OFFICE O/P EST MOD 30 MIN: CPT | Performed by: FAMILY MEDICINE

## 2022-10-26 RX ORDER — DEXTROAMPHETAMINE SACCHARATE, AMPHETAMINE ASPARTATE MONOHYDRATE, DEXTROAMPHETAMINE SULFATE AND AMPHETAMINE SULFATE 3.75; 3.75; 3.75; 3.75 MG/1; MG/1; MG/1; MG/1
15 CAPSULE, EXTENDED RELEASE ORAL EVERY MORNING
Qty: 30 CAPSULE | Refills: 0 | Status: SHIPPED | OUTPATIENT
Start: 2022-10-26

## 2025-01-24 NOTE — PROGRESS NOTES
HPI:    Patient ID: Deedee Garduno is a 25year old male. Patient with the pain dorsum right foot for more then a month. Denies any direct injury. He does walk a lot at his work.    Pain is now there all the time, worse with prolonged walking
no BM doc'd; not on bowel regimen

## (undated) NOTE — LETTER
1/26/2018              Martin Molina        [address] [address]         To Whom It May Concern:    Martin Molina may return to work on Monday January 29, 2018. Please contact me if you have any questions.

## (undated) NOTE — LETTER
Date & Time: 9/7/2021, 3:55 PM  Patient: Karmen Levinene      To Whom It May Concern:    Ty Astudillo was seen and treated in our department on 9/7/2021. He should not return to work until 9/9.     If you have any questions or concerns,

## (undated) NOTE — LETTER
October 23, 2017    Patient: Monae Turner   Date of Visit: 10/23/2017       To Whom It May Concern:    Monae Turner was seen and treated in our emergency department on 10/23/2017. He should not return to work until 10/25/17.     If you h

## (undated) NOTE — LETTER
Date & Time: 8/30/2021, 2:40 PM  Patient: Kerry Esparzacaio  Encounter Provider(s):    ANDREA Clemente       To Whom It May Concern:    Randa Landa was seen and treated in our department on 8/30/2021.  He should not return to work u

## (undated) NOTE — LETTER
Date & Time: 9/7/2021, 4:00 PM  Patient: Steph Marroquin      To Whom It May Concern:    Elizabeth Mederos was seen and treated in our department on 9/7/2021. He can return to work after 3pm on 9/8.     If you have any questions or concerns,

## (undated) NOTE — ED AVS SNAPSHOT
Taisha Anderson   MRN: [id number]    Department:  [facility] Emergency Department   Date of Visit:  10/23/2017           Disclosure     Insurance plans vary and the physician(s) referred by the ER may not be covered by your plan.  Please co CARE PHYSICIAN AT ONCE OR RETURN IMMEDIATELY TO THE EMERGENCY DEPARTMENT. If you have been prescribed any medication(s), please fill your prescription right away and begin taking the medication(s) as directed.   If you believe that any of the medications

## (undated) NOTE — LETTER
Date: 4/10/2018    Patient Name: Soco Gaona          To Whom it may concern: This letter has been written at the patient's request. The above patient was seen at the [facility] for treatment of a medical condition.     This patien

## (undated) NOTE — LETTER
Date & Time: 8/28/2021, 12:27 PM  Patient: Amairani Guardian  Encounter Provider(s):    ANDREA Edmonds       To Whom It May Concern:    Jaqui Bennett was seen and treated in our department on 8/28/2021.  He should not return to work

## (undated) NOTE — LETTER
October 1, 2017    Patient: Ynes Ascencio   Date of Visit: 10/1/2017       To Whom It May Concern:    Ynes Ascencio was seen and treated in our emergency department on 10/1/2017. He can return to work 10/3/17. Please excuse 10/2/17.     If

## (undated) NOTE — ED AVS SNAPSHOT
Pari Gibbs   MRN: [id number]    Department:  [facility] Emergency Department   Date of Visit:  10/1/2017           Disclosure     Insurance plans vary and the physician(s) referred by the ER may not be covered by your plan.  Please con CARE PHYSICIAN AT ONCE OR RETURN IMMEDIATELY TO THE EMERGENCY DEPARTMENT. If you have been prescribed any medication(s), please fill your prescription right away and begin taking the medication(s) as directed.   If you believe that any of the medications